# Patient Record
Sex: MALE | Race: WHITE | NOT HISPANIC OR LATINO | URBAN - METROPOLITAN AREA
[De-identification: names, ages, dates, MRNs, and addresses within clinical notes are randomized per-mention and may not be internally consistent; named-entity substitution may affect disease eponyms.]

---

## 2021-08-02 ENCOUNTER — OUTPATIENT (OUTPATIENT)
Dept: OUTPATIENT SERVICES | Facility: HOSPITAL | Age: 26
LOS: 1 days | End: 2021-08-02
Payer: MEDICAID

## 2021-08-02 ENCOUNTER — APPOINTMENT (OUTPATIENT)
Dept: ULTRASOUND IMAGING | Facility: CLINIC | Age: 26
End: 2021-08-02
Payer: MEDICAID

## 2021-08-02 DIAGNOSIS — R59.0 LOCALIZED ENLARGED LYMPH NODES: ICD-10-CM

## 2021-08-02 PROCEDURE — 76536 US EXAM OF HEAD AND NECK: CPT | Mod: 26

## 2021-08-02 PROCEDURE — 76536 US EXAM OF HEAD AND NECK: CPT

## 2021-08-04 ENCOUNTER — TRANSCRIPTION ENCOUNTER (OUTPATIENT)
Age: 26
End: 2021-08-04

## 2024-08-29 ENCOUNTER — APPOINTMENT (OUTPATIENT)
Dept: PULMONOLOGY | Facility: CLINIC | Age: 29
End: 2024-08-29

## 2024-08-29 ENCOUNTER — EMERGENCY (EMERGENCY)
Facility: HOSPITAL | Age: 29
LOS: 1 days | Discharge: HOME | End: 2024-08-29
Attending: EMERGENCY MEDICINE
Payer: MEDICAID

## 2024-08-29 VITALS
OXYGEN SATURATION: 100 % | WEIGHT: 158.51 LBS | HEART RATE: 97 BPM | DIASTOLIC BLOOD PRESSURE: 78 MMHG | HEIGHT: 70 IN | RESPIRATION RATE: 20 BRPM | SYSTOLIC BLOOD PRESSURE: 145 MMHG

## 2024-08-29 LAB
ALBUMIN SERPL ELPH-MCNC: 4.5 G/DL — SIGNIFICANT CHANGE UP (ref 3.3–5)
ALP SERPL-CCNC: 72 U/L — SIGNIFICANT CHANGE UP (ref 40–120)
ALT FLD-CCNC: 26 U/L — SIGNIFICANT CHANGE UP (ref 10–45)
ANION GAP SERPL CALC-SCNC: 12 MMOL/L — SIGNIFICANT CHANGE UP (ref 5–17)
AST SERPL-CCNC: 22 U/L — SIGNIFICANT CHANGE UP (ref 10–40)
BASOPHILS # BLD AUTO: 0.02 K/UL — SIGNIFICANT CHANGE UP (ref 0–0.2)
BASOPHILS NFR BLD AUTO: 0.3 % — SIGNIFICANT CHANGE UP (ref 0–2)
BILIRUB SERPL-MCNC: 0.6 MG/DL — SIGNIFICANT CHANGE UP (ref 0.2–1.2)
BUN SERPL-MCNC: 21 MG/DL — SIGNIFICANT CHANGE UP (ref 7–23)
CALCIUM SERPL-MCNC: 9.6 MG/DL — SIGNIFICANT CHANGE UP (ref 8.4–10.5)
CHLORIDE SERPL-SCNC: 101 MMOL/L — SIGNIFICANT CHANGE UP (ref 96–108)
CO2 SERPL-SCNC: 25 MMOL/L — SIGNIFICANT CHANGE UP (ref 22–31)
CREAT SERPL-MCNC: 1.13 MG/DL — SIGNIFICANT CHANGE UP (ref 0.5–1.3)
EGFR: 91 ML/MIN/1.73M2 — SIGNIFICANT CHANGE UP
EOSINOPHIL # BLD AUTO: 0.04 K/UL — SIGNIFICANT CHANGE UP (ref 0–0.5)
EOSINOPHIL NFR BLD AUTO: 0.7 % — SIGNIFICANT CHANGE UP (ref 0–6)
FLUAV AG NPH QL: SIGNIFICANT CHANGE UP
FLUBV AG NPH QL: SIGNIFICANT CHANGE UP
GLUCOSE SERPL-MCNC: 109 MG/DL — HIGH (ref 70–99)
HCT VFR BLD CALC: 44.3 % — SIGNIFICANT CHANGE UP (ref 39–50)
HGB BLD-MCNC: 15 G/DL — SIGNIFICANT CHANGE UP (ref 13–17)
IMM GRANULOCYTES NFR BLD AUTO: 0.3 % — SIGNIFICANT CHANGE UP (ref 0–0.9)
LYMPHOCYTES # BLD AUTO: 0.99 K/UL — LOW (ref 1–3.3)
LYMPHOCYTES # BLD AUTO: 17.2 % — SIGNIFICANT CHANGE UP (ref 13–44)
MCHC RBC-ENTMCNC: 27.7 PG — SIGNIFICANT CHANGE UP (ref 27–34)
MCHC RBC-ENTMCNC: 33.9 GM/DL — SIGNIFICANT CHANGE UP (ref 32–36)
MCV RBC AUTO: 81.9 FL — SIGNIFICANT CHANGE UP (ref 80–100)
MONOCYTES # BLD AUTO: 0.45 K/UL — SIGNIFICANT CHANGE UP (ref 0–0.9)
MONOCYTES NFR BLD AUTO: 7.8 % — SIGNIFICANT CHANGE UP (ref 2–14)
NEUTROPHILS # BLD AUTO: 4.23 K/UL — SIGNIFICANT CHANGE UP (ref 1.8–7.4)
NEUTROPHILS NFR BLD AUTO: 73.7 % — SIGNIFICANT CHANGE UP (ref 43–77)
NRBC # BLD: 0 /100 WBCS — SIGNIFICANT CHANGE UP (ref 0–0)
PLATELET # BLD AUTO: 221 K/UL — SIGNIFICANT CHANGE UP (ref 150–400)
POTASSIUM SERPL-MCNC: 4 MMOL/L — SIGNIFICANT CHANGE UP (ref 3.5–5.3)
POTASSIUM SERPL-SCNC: 4 MMOL/L — SIGNIFICANT CHANGE UP (ref 3.5–5.3)
PROT SERPL-MCNC: 7.5 G/DL — SIGNIFICANT CHANGE UP (ref 6–8.3)
RBC # BLD: 5.41 M/UL — SIGNIFICANT CHANGE UP (ref 4.2–5.8)
RBC # FLD: 12.4 % — SIGNIFICANT CHANGE UP (ref 10.3–14.5)
RSV RNA NPH QL NAA+NON-PROBE: SIGNIFICANT CHANGE UP
SARS-COV-2 RNA SPEC QL NAA+PROBE: SIGNIFICANT CHANGE UP
SODIUM SERPL-SCNC: 138 MMOL/L — SIGNIFICANT CHANGE UP (ref 135–145)
WBC # BLD: 5.75 K/UL — SIGNIFICANT CHANGE UP (ref 3.8–10.5)
WBC # FLD AUTO: 5.75 K/UL — SIGNIFICANT CHANGE UP (ref 3.8–10.5)

## 2024-08-29 PROCEDURE — 85025 COMPLETE CBC W/AUTO DIFF WBC: CPT

## 2024-08-29 PROCEDURE — 87637 SARSCOV2&INF A&B&RSV AMP PRB: CPT

## 2024-08-29 PROCEDURE — 71046 X-RAY EXAM CHEST 2 VIEWS: CPT

## 2024-08-29 PROCEDURE — 93005 ELECTROCARDIOGRAM TRACING: CPT

## 2024-08-29 PROCEDURE — 94640 AIRWAY INHALATION TREATMENT: CPT

## 2024-08-29 PROCEDURE — 99285 EMERGENCY DEPT VISIT HI MDM: CPT | Mod: 25

## 2024-08-29 PROCEDURE — 36000 PLACE NEEDLE IN VEIN: CPT

## 2024-08-29 PROCEDURE — 99284 EMERGENCY DEPT VISIT MOD MDM: CPT

## 2024-08-29 PROCEDURE — 84436 ASSAY OF TOTAL THYROXINE: CPT

## 2024-08-29 PROCEDURE — 80053 COMPREHEN METABOLIC PANEL: CPT

## 2024-08-29 PROCEDURE — 84443 ASSAY THYROID STIM HORMONE: CPT

## 2024-08-29 PROCEDURE — 84480 ASSAY TRIIODOTHYRONINE (T3): CPT

## 2024-08-29 PROCEDURE — 71046 X-RAY EXAM CHEST 2 VIEWS: CPT | Mod: 26

## 2024-08-29 RX ADMIN — Medication 2.5 MILLIGRAM(S): at 16:33

## 2024-08-29 NOTE — ED ADULT NURSE NOTE - OBJECTIVE STATEMENT
28 year old male presented to ED with c/o of shortness of breath and anxiety due to 'feeling of object in throat'. pt went to ENT and was diagnosed with a 'globus sensation' caused by anxiety or possible mood disorder. pt prescribed Xanax, pt reports Xanax helping with symptoms but still feels the sensation in throat. pt denies medical history. pt calm and cooperative in ED. pt denies CP, SOB, nausea/vomiting, numbness/tingling, fever, cough, chills, dizziness, headache, blurred vision, neuro intact. pt a&ox3, lung sounds clear, heart rate regular, abdomen soft nontender nondistended to palp. skin intact. IV in LAC 20G and patent. Will continue to monitor and assess while offering support and reassurance.

## 2024-08-29 NOTE — ED PROVIDER NOTE - PHYSICAL EXAMINATION
GEN: Patient awake and alert. No acute distress, non-toxic. Anxious appearing.   Head: Normocephalic, atraumatic.  Neck: Nontender, full ROM.   Eyes: PERRLA b/l. EOMI, no scleral icterus, no conjunctival injection. Moist mucous membranes.  CARDIAC: RRR. Normal S1, S2. No murmur, rubs, or gallops. No peripheral edema noted.  PULM: Speaking in full sentences. CTA B/L no wheeze, rales or rhonchi. No signs of respiratory distress, no accessory muscle usage or nasal flaring.  ABD: Soft, nontender, nondistended. No rebound, no involuntary guarding.   MSK: Moving all extremities spontaneously. Full ROM in extremities. No obvious deformity.  NEURO: A&Ox3, no focal neurological deficits  SKIN: Warm, dry, no rash, no lesions, no open wounds. No urticaria. No jaundice.

## 2024-08-29 NOTE — ED PROVIDER NOTE - NSFOLLOWUPINSTRUCTIONS_ED_ALL_ED_FT
See your Primary Doctor this week for follow up -- call to discuss.    Rest, stay well hydrated, minimize stress.    Use Albuterol with spacer as directed for bronchitis -- see medication warnings.    See BRONCHITIS and ANXIETY information and return instructions given to you.    Seek immediate medical care for new/worsening symptoms/concerns. See your Primary Doctor this week for follow up -- call to discuss.    Rest, stay well hydrated, minimize stress.    Use Albuterol with spacer as directed for bronchitis -- see medication warnings.    See BRONCHITIS and ANXIETY information and return instructions given to you.    Seek immediate medical care for new/worsening symptoms/concerns.    ----------------------------------------------------------------------------------------------------------------     Please follow up at the [facility] which is located at:     [phone] [place] [address] [id number] [phone]    please call to schedule an appointment but they also have walk in hours during the weekday between the hours of 9am to 5pm so you can walk in to see if you can see a psychiatrist then.   ----------------------------------------------------------------------------------------------------------------   Acute Bronchitis, Adult  A comparison between normal and swollen airways, or bronchi, in the lungs.  Acute bronchitis is sudden inflammation of the main airways (bronchi) that come off the windpipe (trachea) in the lungs. The swelling causes the airways to get smaller and make more mucus than normal. This can make it hard to breathe and can cause coughing or noisy breathing (wheezing).    Acute bronchitis may last several weeks. The cough may last longer. Allergies, asthma, and exposure to smoke may make the condition worse.    What are the causes?  This condition can be caused by germs and by substances that irritate the lungs, including:  Cold and flu viruses. The most common cause of this condition is the virus that causes the common cold.  Bacteria. This is less common.  Breathing in substances that irritate the lungs, including:  Smoke from cigarettes and other forms of tobacco.  Dust and pollen.  Fumes from household cleaning products, gases, or burned fuel.  Indoor or outdoor air pollution.  What increases the risk?  The following factors may make you more likely to develop this condition:  A weak body's defense system, also called the immune system.  A condition that affects your lungs and breathing, such as asthma.  What are the signs or symptoms?  Common symptoms of this condition include:  Coughing. This may bring up clear, yellow, or green mucus from your lungs (sputum).  Wheezing.  Runny or stuffy nose.  Having too much mucus in your lungs (chest congestion).  Shortness of breath.  Aches and pains, including sore throat or chest.  How is this diagnosed?  This condition is usually diagnosed based on:  Your symptoms and medical history.  A physical exam.  You may also have other tests, including tests to rule out other conditions, such as pneumonia. These tests include:  A test of lung function.  Test of a mucus sample to look for the presence of bacteria.  Tests to check the oxygen level in your blood.  Blood tests.  Chest X-ray.  How is this treated?  Most cases of acute bronchitis clear up over time without treatment. Your health care provider may recommend:  Drinking more fluids to help thin your mucus so it is easier to cough up.  Taking inhaled medicine (inhaler) to improve air flow in and out of your lungs.  Using a vaporizer or a humidifier. These are machines that add water to the air to help you breathe better.  Taking a medicine that thins mucus and clears congestion (expectorant).  Taking a medicine that prevents or stops coughing (cough suppressant).  It is not common to take an antibiotic medicine for this condition.    Follow these instructions at home:  A do not smoke cigarettes sign.  Take over-the-counter and prescription medicines only as told by your health care provider.  Use an inhaler, vaporizer, or humidifier as told by your health care provider.  Take two teaspoons (10 mL) of honey at bedtime to lessen coughing at night.  Drink enough fluid to keep your urine pale yellow.  Do not use any products that contain nicotine or tobacco. These products include cigarettes, chewing tobacco, and vaping devices, such as e-cigarettes. If you need help quitting, ask your health care provider.  Get plenty of rest.  Return to your normal activities as told by your health care provider. Ask your health care provider what activities are safe for you.  Keep all follow-up visits. This is important.  How is this prevented?  Washing hands with soap and water.  To lower your risk of getting this condition again:  Wash your hands often with soap and water for at least 20 seconds. If soap and water are not available, use hand .  Avoid contact with people who have cold symptoms.  Try not to touch your mouth, nose, or eyes with your hands.  Avoid breathing in smoke or chemical fumes. Breathing smoke or chemical fumes will make your condition worse.  Get the flu shot every year.  Contact a health care provider if:  Your symptoms do not improve after 2 weeks.  You have trouble coughing up the mucus.  Your cough keeps you awake at night.  You have a fever.  Get help right away if you:  Cough up blood.  Feel pain in your chest.  Have severe shortness of breath.  Faint or keep feeling like you are going to faint.  Have a severe headache.  Have a fever or chills that get worse.  These symptoms may represent a serious problem that is an emergency. Do not wait to see if the symptoms will go away. Get medical help right away. Call your local emergency services (911 in the [place] Do not drive yourself to the hospital.    Summary  Acute bronchitis is inflammation of the main airways (bronchi) that come off the windpipe (trachea) in the lungs. The swelling causes the airways to get smaller and make more mucus than normal.  Drinking more fluids can help thin your mucus so it is easier to cough up.  Take over-the-counter and prescription medicines only as told by your health care provider.  Do not use any products that contain nicotine or tobacco. These products include cigarettes, chewing tobacco, and vaping devices, such as e-cigarettes. If you need help quitting, ask your health care provider.  Contact a health care provider if your symptoms do not improve after 2 weeks.  ----------------------------------------------------------------------------------------------------------------   Managing Anxiety, Adult    After being diagnosed with anxiety, you may be relieved to know why you have felt or behaved a certain way. You may also feel overwhelmed about the treatment ahead and what it will mean for your life. With care and support, you can manage your anxiety.    How to manage lifestyle changes  Understanding the difference between stress and anxiety    Although stress can play a role in anxiety, it is not the same as anxiety. Stress is your body's reaction to life changes and events, both good and bad. Stress is often caused by something external, such as a deadline, test, or competition. It normally goes away after the event has ended and will last just a few hours. But, stress can be ongoing and can lead to more than just stress.    Anxiety is caused by something internal, such as imagining a terrible outcome or worrying that something will go wrong that will greatly upset you. Anxiety often does not go away even after the event is over, and it can become a long-term (chronic) worry.    Lowering stress and anxiety    An adult doing mindful breathing while practicing yoga.  Talk with your health care provider or a counselor to learn more about lowering anxiety and stress. They may suggest tension-reduction techniques, such as:  Music. Spend time creating or listening to music that you enjoy and that inspires you.  Mindfulness-based meditation. Practice being aware of your normal breaths while not trying to control your breathing. It can be done while sitting or walking.  Centering prayer. Focus on a word, phrase, or sacred image that means something to you and brings you peace.  Deep breathing. Expand your stomach and inhale slowly through your nose. Hold your breath for 3–5 seconds. Then breathe out slowly, letting your stomach muscles relax.  Self-talk. Learn to notice and spot thought patterns that lead to anxiety reactions. Change those patterns to thoughts that feel peaceful.  Muscle relaxation. Take time to tense muscles and then relax them.  Choose a tension-reduction technique that fits your lifestyle and personality. These techniques take time and practice. Set aside 5–15 minutes a day to do them. Specialized therapists can offer counseling and training in these techniques. The training to help with anxiety may be covered by some insurance plans.    Other things you can do to manage stress and anxiety include:  Keeping a stress diary. This can help you learn what triggers your reaction and then learn ways to manage your response.  Thinking about how you react to certain situations. You may not be able to control everything, but you can control your response.  Making time for activities that help you relax and not feeling guilty about spending your time in this way.  Doing visual imagery. This involves imagining or creating mental pictures to help you relax.  Practicing yoga. Through yoga poses, you can lower tension and relax.    Medicines    Medicines for anxiety include:  Antidepressant medicines. These are usually prescribed for long-term daily control.  Anti-anxiety medicines. These may be added in severe cases, especially when panic attacks occur.  When used together, medicines, psychotherapy, and tension-reduction techniques may be the most effective treatment.    Relationships    Relationships can play a big part in helping you recover. Spend more time connecting with trusted friends and family members. Think about going to couples counseling if you have a partner, taking family education classes, or going to family therapy. Therapy can help you and others better understand your anxiety.    How to recognize changes in your anxiety  Everyone responds differently to treatment for anxiety. Recovery from anxiety happens when symptoms lessen and stop interfering with your daily life at home or work. This may mean that you will start to:  Have better concentration and focus. Worry will interfere less in your daily thinking.  Sleep better.  Be less irritable.  Have more energy.  Have improved memory.  Try to recognize when your condition is getting worse. Contact your provider if your symptoms interfere with home or work and you feel like your condition is not improving.    Follow these instructions at home:    Activity    Exercise. Adults should:  Exercise for at least 150 minutes each week. The exercise should increase your heart rate and make you sweat (moderate-intensity exercise).  Do strengthening exercises at least twice a week.  Get the right amount and quality of sleep. Most adults need 7–9 hours of sleep each night.    Lifestyle    Two adults cooking together in a kitchen. Fruit and vegetables are on the counter in front of them.  Eat a healthy diet that includes plenty of vegetables, fruits, whole grains, low-fat dairy products, and lean protein.  Do not eat a lot of foods that are high in fats, added sugars, or salt (sodium).  Make choices that simplify your life.  Do not use any products that contain nicotine or tobacco. These products include cigarettes, chewing tobacco, and vaping devices, such as e-cigarettes. If you need help quitting, ask your provider.  Avoid caffeine, alcohol, and certain over-the-counter cold medicines. These may make you feel worse. Ask your pharmacist which medicines to avoid.    General instructions    Take over-the-counter and prescription medicines only as told by your provider.  Keep all follow-up visits. This is to make sure you are managing your anxiety well or if you need more support.  Where to find support  You can get help and support from:  Self-help groups.  Online and community organizations.  A trusted spiritual leader.  Couples counseling.  Family education classes.  Family therapy.  Where to find more information  You may find that joining a support group helps you deal with your anxiety. The following sources can help you find counselors or support groups near you:  Mental Health [place]: mentalhealth[place].net  Anxiety and Depression Association of [place] (ADAA): adaa.org  National [place] on Mental Illness (KIRSTIE): kirstie.org    Contact a health care provider if:  You have a hard time staying focused or finishing tasks.  You spend many hours a day feeling worried about everyday life.  You are very tired because you cannot stop worrying.  You start to have headaches or often feel tense.  You have chronic nausea or diarrhea.    Get help right away if:  Your heart feels like it is racing.  You have shortness of breath.  You have thoughts of hurting yourself or others.    Get help right away if you feel like you may hurt yourself or others, or have thoughts about taking your own life. Go to your nearest emergency room or:  Call 911.  Call the National Suicide Prevention Lifeline at 1-714.338.9982 or 227. This is open 24 hours a day.  Text the Crisis Text Line at 500806.

## 2024-08-29 NOTE — ED ADULT TRIAGE NOTE - SPO2 (%)
Pt is requesting a covid test and does not have a [facility]'s PCP  Reports having an out of network PCP  Order placed  Pt informed of closest testing site and was advised of hours of operation, address, to wear a mask, and to stay in the car  Pt verbalized understanding  Link sent to pts email address to create a Center[facility] account to check for results  Reason for Disposition   [1] COVID-19 infection suspected by caller or triager AND [2] mild symptoms (cough, fever, or others) AND [0] no complications or SOB    Answer Assessment - Initial Assessment Questions  Were you within 6 feet or less, for up to 15 minutes or more with a person that has a confirmed COVID-19 test? Yes     What was the date of your exposure? Living with daughter who tested positive     Are you experiencing any symptoms attributed to the virus?  (Assess for SOB, cough, fever, difficulty breathing) mild cough     HIGH RISK: Do you have any history heart or lung conditions, weakened immune system, diabetes, Asthma, CHF, HIV, COPD, Chemo, renal failure, sickle cell, etc? 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  N/A    Protocols used: CORONAVIRUS (COVID-19) DIAGNOSED OR SUSPECTED-ADULT- 100

## 2024-08-29 NOTE — ED ADULT NURSE NOTE - CHIEF COMPLAINT QUOTE
Pt reports SOB, throat/chest tightness and feeling "a blockage." Pt speaking in clear coherent sentences with no obvious signs of respiratory distress   Pt reports sore throat last week, now resolved   Was seen by ENT yesterday and neg for scope. Pt was prescribed xanax yesterday due to suspected anxiety. Pt took xanax prior to coming into the ED

## 2024-08-29 NOTE — ED PROVIDER NOTE - PROGRESS NOTE DETAILS
Cachorro Morgan MD PGY2: Patient reassessed, stable. Peak flow prior to beta agonist is average ~300 after 3 trials with good effort. Will give beta agonist and assess response. Cachorro Morgan MD PGY2: Patient reassessed, stable. Post bronchodilator with peak flow 700-800. Marked positive response to bronchodilator suggesting likely obstructing lung disease / asthma that was flared or presented itself in setting of recent URI. Tolerated PO. Patient has albuterol MDI. Given spacer. Will DC with [facility] resources and pulmonology follow up. Patient given strict return precautions.

## 2024-08-29 NOTE — ED PROVIDER NOTE - RAPID ASSESSMENT
29 y/o M sore throat and HA last week with other URI sxs reports a variety of different colors of sputum, now with persistent "thick white mucous" and feeling chest congestion and difficulty breathing. Pt reports he has followed with ENT and had laryngoscopy reportedly non-actionable with no reportedly abnormal findings. Patient reports having significant psychologic distress over symptoms as he feels that he could "stop breathing and a second".  He was prescribed Xanax by ENT for possible anxiety component, but patient believes there is still organic pathology, tried to make an appointment with pulmonologist today but required referral from his PCP, was unable to get in touch with his PCP so came to the ED for further evaluation.  Patient denies history of mood disorder/anxiety.  [phone]  Patient was seen as a rapid assessment (QPA) patient. This is an incomplete workup and it is intended that the patient will be seen in the main emergency department. The patient will be seen and further worked up in the main emergency department and their care will be completed by the main emergency department team along with a thorough physical exam. Receiving team will follow up on labs, analgesia, any clinical imaging, reassess and disposition as clinically indicated, all decisions regarding the progression of care will be made at their discretion. - Cristo Mitchell PA-C

## 2024-08-29 NOTE — ED PROVIDER NOTE - ATTENDING CONTRIBUTION TO CARE
------------ATTENDING NOTE------------  pt c/o sob / vague chest discomfort, has been coughing, no fevers, no known asthma / airway disease, trial peak flow w/ improved air movement, CXR w/ signs hyperinflation, ? reactive component to bronchitis, labs overall wnl, complicated as pt has baseline anxiety and very anxious over this -- clear chest /wo distress, nml cardiac exam, equal distal pulses, soft benign abd, no edema/calf tenderness, no drugs/intoxicants, no SI/HI and feels safe at home and not interested in c/w Psych today -- nml VS when calm (HR 70s, RR 12), toleraitng PO, in depth dw pt about ddx, tx, masters, continued close outpt fu.  - Mark Anthony Nation MD   ------------------------------------------------------

## 2024-08-29 NOTE — ED PROVIDER NOTE - NSFOLLOWUPCLINICS_GEN_ALL_ED_FT
[facility]  Pulmonology  [address] [place] [address], [facility] [address]  [place] [address]  Phone: [phone]  Fax:   Follow Up Time: 1-3 Days

## 2024-08-29 NOTE — ED PROVIDER NOTE - OBJECTIVE STATEMENT
28-year-old male with no segment past medical history presenting to the emergency department for several days of persistent congestion/chest discomfort with difficulty breathing.  Patient reports symptoms started after URI last week.  Has had significant persistent thick sputum.  Went to an ENT who did a laryngoscopy that saw no actionable findings, congestion, trauma, abnormalities in the airway.  Patient reports he is having significant stress over the symptoms and feels like he could "stop breathing any second".  He was prescribed Xanax by ENT for possible anxiety component and to treat globus sensation which he feels like sometimes works temporarily.  Was recommended to see a pulmonologist but has not been on that able due to need for referral from his PCP.  Was also prescribed albuterol inhaler which he only tried 1 time and Augmentin.  Patient reports a retained ability to tolerate oral intake.  Denies fever/chills.  No syncope or near syncope.  Denies SI, HI, AVH.

## 2024-08-29 NOTE — ED ADULT NURSE NOTE - SUICIDE SCREENING QUESTION 1
Patient's colonoscopy has been changed to 1/23/2023 with Dr Tabares. Patient states that he has his prep and the instructions.       Yes

## 2024-08-29 NOTE — ED PROVIDER NOTE - PATIENT PORTAL LINK FT
You can access the FollowMyHealth Patient Portal offered by [facility] by registering at the following website: [email or web address]. By joining [facility]’s FollowMyHealth portal, you will also be able to view your health information using other applications (apps) compatible with our system.

## 2024-08-29 NOTE — ED ADULT NURSE NOTE - NSFALLUNIVINTERV_ED_ALL_ED
Bed/Stretcher in lowest position, wheels locked, appropriate side rails in place/Call bell, personal items and telephone in reach/Instruct patient to call for assistance before getting out of bed/chair/stretcher/Non-slip footwear applied when patient is off stretcher/[place] to call system/Physically safe environment - no spills, clutter or unnecessary equipment/Purposeful proactive rounding/Room/bathroom lighting operational, light cord in reach

## 2024-08-30 ENCOUNTER — EMERGENCY (EMERGENCY)
Facility: HOSPITAL | Age: 29
LOS: 1 days | Discharge: HOME | End: 2024-08-30
Payer: MEDICAID

## 2024-08-30 VITALS
HEART RATE: 82 BPM | TEMPERATURE: 98 F | RESPIRATION RATE: 18 BRPM | DIASTOLIC BLOOD PRESSURE: 73 MMHG | SYSTOLIC BLOOD PRESSURE: 119 MMHG | OXYGEN SATURATION: 98 %

## 2024-08-30 VITALS
DIASTOLIC BLOOD PRESSURE: 75 MMHG | WEIGHT: 158.73 LBS | HEART RATE: 98 BPM | OXYGEN SATURATION: 98 % | RESPIRATION RATE: 18 BRPM | TEMPERATURE: 99 F | SYSTOLIC BLOOD PRESSURE: 134 MMHG | HEIGHT: 70 IN

## 2024-08-30 DIAGNOSIS — F43.22 ADJUSTMENT DISORDER WITH ANXIETY: ICD-10-CM

## 2024-08-30 LAB
ALBUMIN SERPL ELPH-MCNC: 4.4 G/DL — SIGNIFICANT CHANGE UP (ref 3.3–5)
ALP SERPL-CCNC: 69 U/L — SIGNIFICANT CHANGE UP (ref 40–120)
ALT FLD-CCNC: 24 U/L — SIGNIFICANT CHANGE UP (ref 10–45)
ANION GAP SERPL CALC-SCNC: 11 MMOL/L — SIGNIFICANT CHANGE UP (ref 5–17)
APAP SERPL-MCNC: <15 UG/ML — SIGNIFICANT CHANGE UP (ref 10–30)
AST SERPL-CCNC: 18 U/L — SIGNIFICANT CHANGE UP (ref 10–40)
BASOPHILS # BLD AUTO: 0.02 K/UL — SIGNIFICANT CHANGE UP (ref 0–0.2)
BASOPHILS NFR BLD AUTO: 0.4 % — SIGNIFICANT CHANGE UP (ref 0–2)
BILIRUB SERPL-MCNC: 0.8 MG/DL — SIGNIFICANT CHANGE UP (ref 0.2–1.2)
BUN SERPL-MCNC: 18 MG/DL — SIGNIFICANT CHANGE UP (ref 7–23)
CALCIUM SERPL-MCNC: 9.6 MG/DL — SIGNIFICANT CHANGE UP (ref 8.4–10.5)
CHLORIDE SERPL-SCNC: 103 MMOL/L — SIGNIFICANT CHANGE UP (ref 96–108)
CO2 SERPL-SCNC: 24 MMOL/L — SIGNIFICANT CHANGE UP (ref 22–31)
CREAT SERPL-MCNC: 1.18 MG/DL — SIGNIFICANT CHANGE UP (ref 0.5–1.3)
EGFR: 87 ML/MIN/1.73M2 — SIGNIFICANT CHANGE UP
EOSINOPHIL # BLD AUTO: 0.05 K/UL — SIGNIFICANT CHANGE UP (ref 0–0.5)
EOSINOPHIL NFR BLD AUTO: 1 % — SIGNIFICANT CHANGE UP (ref 0–6)
ETHANOL SERPL-MCNC: <10 MG/DL — SIGNIFICANT CHANGE UP (ref 0–10)
GLUCOSE SERPL-MCNC: 105 MG/DL — HIGH (ref 70–99)
HCT VFR BLD CALC: 43.2 % — SIGNIFICANT CHANGE UP (ref 39–50)
HGB BLD-MCNC: 14.9 G/DL — SIGNIFICANT CHANGE UP (ref 13–17)
IMM GRANULOCYTES NFR BLD AUTO: 0.4 % — SIGNIFICANT CHANGE UP (ref 0–0.9)
LYMPHOCYTES # BLD AUTO: 0.91 K/UL — LOW (ref 1–3.3)
LYMPHOCYTES # BLD AUTO: 17.6 % — SIGNIFICANT CHANGE UP (ref 13–44)
MCHC RBC-ENTMCNC: 28.4 PG — SIGNIFICANT CHANGE UP (ref 27–34)
MCHC RBC-ENTMCNC: 34.5 GM/DL — SIGNIFICANT CHANGE UP (ref 32–36)
MCV RBC AUTO: 82.4 FL — SIGNIFICANT CHANGE UP (ref 80–100)
MONOCYTES # BLD AUTO: 0.51 K/UL — SIGNIFICANT CHANGE UP (ref 0–0.9)
MONOCYTES NFR BLD AUTO: 9.9 % — SIGNIFICANT CHANGE UP (ref 2–14)
NEUTROPHILS # BLD AUTO: 3.66 K/UL — SIGNIFICANT CHANGE UP (ref 1.8–7.4)
NEUTROPHILS NFR BLD AUTO: 70.7 % — SIGNIFICANT CHANGE UP (ref 43–77)
NRBC # BLD: 0 /100 WBCS — SIGNIFICANT CHANGE UP (ref 0–0)
PLATELET # BLD AUTO: 210 K/UL — SIGNIFICANT CHANGE UP (ref 150–400)
POTASSIUM SERPL-MCNC: 4 MMOL/L — SIGNIFICANT CHANGE UP (ref 3.5–5.3)
POTASSIUM SERPL-SCNC: 4 MMOL/L — SIGNIFICANT CHANGE UP (ref 3.5–5.3)
PROT SERPL-MCNC: 7.3 G/DL — SIGNIFICANT CHANGE UP (ref 6–8.3)
RBC # BLD: 5.24 M/UL — SIGNIFICANT CHANGE UP (ref 4.2–5.8)
RBC # FLD: 12.3 % — SIGNIFICANT CHANGE UP (ref 10.3–14.5)
SALICYLATES SERPL-MCNC: <2 MG/DL — LOW (ref 15–30)
SARS-COV-2 RNA SPEC QL NAA+PROBE: SIGNIFICANT CHANGE UP
SODIUM SERPL-SCNC: 138 MMOL/L — SIGNIFICANT CHANGE UP (ref 135–145)
T3 SERPL-MCNC: 70 NG/DL — LOW (ref 80–200)
T4 AB SER-ACNC: 7.3 UG/DL — SIGNIFICANT CHANGE UP (ref 4.6–12)
TROPONIN T, HIGH SENSITIVITY RESULT: 7 NG/L — SIGNIFICANT CHANGE UP (ref 0–51)
TSH SERPL-MCNC: 1.18 UIU/ML — SIGNIFICANT CHANGE UP (ref 0.27–4.2)
TSH SERPL-MCNC: 1.35 UIU/ML — SIGNIFICANT CHANGE UP (ref 0.27–4.2)
WBC # BLD: 5.17 K/UL — SIGNIFICANT CHANGE UP (ref 3.8–10.5)
WBC # FLD AUTO: 5.17 K/UL — SIGNIFICANT CHANGE UP (ref 3.8–10.5)

## 2024-08-30 PROCEDURE — 85379 FIBRIN DEGRADATION QUANT: CPT

## 2024-08-30 PROCEDURE — 84443 ASSAY THYROID STIM HORMONE: CPT

## 2024-08-30 PROCEDURE — 99285 EMERGENCY DEPT VISIT HI MDM: CPT

## 2024-08-30 PROCEDURE — 87635 SARS-COV-2 COVID-19 AMP PRB: CPT

## 2024-08-30 PROCEDURE — 84484 ASSAY OF TROPONIN QUANT: CPT

## 2024-08-30 PROCEDURE — 90792 PSYCH DIAG EVAL W/MED SRVCS: CPT

## 2024-08-30 PROCEDURE — 83880 ASSAY OF NATRIURETIC PEPTIDE: CPT

## 2024-08-30 PROCEDURE — 85025 COMPLETE CBC W/AUTO DIFF WBC: CPT

## 2024-08-30 PROCEDURE — 94640 AIRWAY INHALATION TREATMENT: CPT

## 2024-08-30 PROCEDURE — 80307 DRUG TEST PRSMV CHEM ANLYZR: CPT

## 2024-08-30 PROCEDURE — 80053 COMPREHEN METABOLIC PANEL: CPT

## 2024-08-30 PROCEDURE — 99285 EMERGENCY DEPT VISIT HI MDM: CPT | Mod: 25

## 2024-08-30 RX ORDER — MAGNESIUM, ALUMINUM HYDROXIDE 200-225/5
30 SUSPENSION, ORAL (FINAL DOSE FORM) ORAL EVERY 4 HOURS
Refills: 0 | Status: DISCONTINUED | OUTPATIENT
Start: 2024-08-30 | End: 2024-09-02

## 2024-08-30 RX ORDER — LORAZEPAM 4 MG/ML
1 INJECTION INTRAMUSCULAR; INTRAVENOUS ONCE
Refills: 0 | Status: DISCONTINUED | OUTPATIENT
Start: 2024-08-30 | End: 2024-08-30

## 2024-08-30 RX ORDER — FAMOTIDINE 10 MG/ML
20 INJECTION INTRAVENOUS ONCE
Refills: 0 | Status: COMPLETED | OUTPATIENT
Start: 2024-08-30 | End: 2024-08-30

## 2024-08-30 RX ORDER — IPRATROPIUM BROMIDE AND ALBUTEROL SULFATE .5; 3 MG/3ML; MG/3ML
3 SOLUTION RESPIRATORY (INHALATION) ONCE
Refills: 0 | Status: COMPLETED | OUTPATIENT
Start: 2024-08-30 | End: 2024-08-30

## 2024-08-30 RX ORDER — LORAZEPAM 4 MG/ML
1 INJECTION INTRAMUSCULAR; INTRAVENOUS
Qty: 7 | Refills: 0
Start: 2024-08-30 | End: 2024-09-05

## 2024-08-30 RX ORDER — KETOROLAC TROMETHAMINE 30 MG/ML
15 INJECTION, SOLUTION INTRAMUSCULAR ONCE
Refills: 0 | Status: DISCONTINUED | OUTPATIENT
Start: 2024-08-30 | End: 2024-08-30

## 2024-08-30 RX ORDER — FAMOTIDINE 10 MG/ML
20 INJECTION INTRAVENOUS ONCE
Refills: 0 | Status: DISCONTINUED | OUTPATIENT
Start: 2024-08-30 | End: 2024-08-30

## 2024-08-30 RX ADMIN — IPRATROPIUM BROMIDE AND ALBUTEROL SULFATE 3 MILLILITER(S): .5; 3 SOLUTION RESPIRATORY (INHALATION) at 15:51

## 2024-08-30 RX ADMIN — Medication 30 MILLILITER(S): at 16:43

## 2024-08-30 RX ADMIN — LORAZEPAM 1 MILLIGRAM(S): 4 INJECTION INTRAMUSCULAR; INTRAVENOUS at 18:45

## 2024-08-30 RX ADMIN — LORAZEPAM 1 MILLIGRAM(S): 4 INJECTION INTRAMUSCULAR; INTRAVENOUS at 11:20

## 2024-08-30 RX ADMIN — FAMOTIDINE 20 MILLIGRAM(S): 10 INJECTION INTRAVENOUS at 16:07

## 2024-08-30 NOTE — ED BEHAVIORAL HEALTH ASSESSMENT NOTE - SUMMARY
Pt is a 27yo male w/o PMH or past psych hx presents with SOB and associated anxiety. His ENT workup and X-ray were negative for pathology and he received albuterol and xanax to help his symptoms. He states that "I can't live like this, I have trouble breathing, I don't want to live like this". Denies an intent to hurt himself, or wanting to be dead. A&Ox4. Denies HIIP/AVH.

## 2024-08-30 NOTE — ED BEHAVIORAL HEALTH ASSESSMENT NOTE - DESCRIPTION
Received one dose of Ativan 1mg at 11 am for anxiety which helped him calming down. nk Lives with parents, self employed and works part-time in [employer], have a list of hobbies and generally healthy and functional

## 2024-08-30 NOTE — ED ADULT NURSE NOTE - OBJECTIVE STATEMENT
28 year old male BIB sister with anxiety; A&O; passive SI, no plan or intent, states "I don't want to live like this anymore" and did take three xanax 0.25 prior to arrival but not an attempt to OD; stopped cannabis a few days ago, denies ETOH use; this is an anxious but pleasant pt, wanding done and clothes and valuables taken, CO begun. 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

## 2024-08-30 NOTE — ED ADULT NURSE REASSESSMENT NOTE - NS ED NURSE REASSESS COMMENT FT1
Pt had received ativan 1mg PO this AM and was endorsing relief of anxiety, throughout day he appeared more relaxed and was smiling and laughing with staff; in late afternoon after being cleared by psychiatry pt began perseverating stating he did not want to go home, was spoken to by attending and resident as well as writer and remained anxious, pt was given referrals to [facility] and was told to follow up, his sister was at bedside the whole time, pt remained resistant and asked for some time before he left.

## 2024-08-30 NOTE — ED PROVIDER NOTE - PHYSICAL EXAMINATION
General: Anxious appearing, well nourished, well kept.   HEENT: EOMI, PERRLA, normal mucosa, normal oropharynx, no lesions on the lips or on oral mucosa, normal external ear  Neck: supple, no lymphadenopathy, full range of motion, no nuchal rigidity  CV: RRR, normal S1 and S2 with no murmur, capillary refill less than two seconds  Resp: lungs CTA b/l, good aeration bilaterally, symmetric chest wall   Abd: non-distended, soft, non-tender  : no CVA tenderness  MSK: full range of motion, no cyanosis, no edema, no clubbing, no immobility  Neuro: CN II-XII grossly intact, muscle strength 5/5 in all extremities, normal gait  Skin: no rashes, skin intact

## 2024-08-30 NOTE — ED PROVIDER NOTE - CLINICAL SUMMARY MEDICAL DECISION MAKING FREE TEXT BOX
Patient is a 28-year-old male, sister at bedside, no reported past medical history , Daily marijuana user, presents complaining of difficulty breathing/sensation of globus in his chest x approximately 1 week. Of note patient was seen here yesterday for similar complaint.  He was previously seen by ENT and scoped without findings of organic pathology and given Xanax for anxiety which she notes he has been taking several times a day. Here yesterday he had a chest x-ray showing hyperinflated lungs and was discharged after increased peak flow with bronchodilators, given albuterol MDI with spacer.  He was discharged with [facility] resources and pulm follow-up.  He states that last night he went home and took his Xanax for anxiety but woke up around 4 AM and he was pacing around his room and could not sleep and felt like his breathing was very quickly and his pulse rate started to go up. On presentation patient states "I feel like I just want to die–I want to go to sleep and not wake up".  When asked if he has a plan he states "not yet but if things keep going how they are…".His sister at bedside states he has sometimes been known to perseverate over some medical diagnoses but has never been this anxious in the past. He denies any homicidal ideation, denies any hallucinations.  Aside from almost daily marijuana use he denies any other substance use including any other illicit drugs or alcohol.  In terms of his current symptoms he states they all started after a URI. He denies any chest pain or syncope/presyncope.  DDx/plan- Given patients' recent workup will not repeat chest xray. This may be obstructive pulmonary disease exacerbated by a recent viral illnes. Took bronchodilator this morning and mildly tachycardic,low suspicion for PE but will obtain D-dimer. Will obtain EKG, trop but extremely low suspicion for ACS. Will obtain psych clearance labs including drug screen and psych consult given passive SI. Patient is a 28-year-old male, sister at bedside, no reported past medical history , Daily marijuana user, presents complaining of difficulty breathing/sensation of globus in his chest x approximately 1 week. Of note patient was seen here yesterday for similar complaint.  He was previously seen by ENT and scoped without findings of organic pathology and given Xanax for anxiety which she notes he has been taking several times a day. Here yesterday he had a chest x-ray showing hyperinflated lungs and was discharged after increased peak flow with bronchodilators, given albuterol MDI with spacer.  He was discharged with [facility] resources and pulm follow-up.  He states that last night he went home and took his Xanax for anxiety but woke up around 4 AM and he was pacing around his room and could not sleep and felt like his breathing was very quickly and his pulse rate started to go up. On presentation patient states "I feel like I just want to die–I want to go to sleep and not wake up".  When asked if he has a plan he states "not yet but if things keep going how they are…".His sister at bedside states he has sometimes been known to perseverate over some medical diagnoses but has never been this anxious in the past. He denies any homicidal ideation, denies any hallucinations.  Aside from almost daily marijuana use he denies any other substance use including any other illicit drugs or alcohol.  In terms of his current symptoms he states they all started after a URI. He denies any chest pain or syncope/presyncope.  DDx/plan- Given patients' recent workup will not repeat chest x-ray. This may be obstructive pulmonary disease exacerbated by a recent viral illnesses. Took bronchodilator this morning and mildly tachycardic, low suspicion for PE but will obtain D-dimer. Will obtain EKG, trop but extremely low suspicion for ACS. Will obtain psych clearance labs including drug screen and psych consult given passive SI.

## 2024-08-30 NOTE — ED BEHAVIORAL HEALTH ASSESSMENT NOTE - DETAILS
Father had anxiety SOB N/A discussed with patient. Call 911 or return to ED should sx. persist/worsen Plan communicated

## 2024-08-30 NOTE — ED BEHAVIORAL HEALTH ASSESSMENT NOTE - HPI (INCLUDE ILLNESS QUALITY, SEVERITY, DURATION, TIMING, CONTEXT, MODIFYING FACTORS, ASSOCIATED SIGNS AND SYMPTOMS)
Patient is a 28-year-old male without previous medical or psychiatric history who presents to ED because of persistent SOB after recent URI. He was in a good state of health up until last Wednesday, when he started to feel cold-like symptoms.  Majority of his symptoms resolved by Sunday, but SOB persisted, which made him anxious that SOB will become chronic. He has seen ENT specialist complaining of feeling bolus-like sensation in his throat. He underwent unrevealing endoscopy and ENT gave him Xanax for anxiety. Hi takes Xanax 12.5mg up to 3x daily and it helps him relax to some extent, but does not solve the issue. He was in the ED yesterday, was given Albuterol and sent home. He was able to fall asleep at home, but woke up at around 4 AM with SOB and anxiety. He decided to return to the ED.   His hobbies include going to the gym and pool, but he couldn't engage in those activities since the onset of his symptoms. He usually cares well for his diet monitoring calories for bodybuilding purposes, but was not able to eat much for the past week due to worries about his condition. He domiciles with siblings and parents, with whom he has supportive relationships.  Admits to smoking marijuana multiple times a week, with the last time smoking around a week ago. Denies using tobacco products, but reports rare consumption of alcohol. A&Ox4. Denies depression. Denies ever having manic episodes. Denies AVH/ HIIB. He states that "I don't want to live like this... with these symptoms", but denies wanting to hurt himself, or having any plan. Denies ever having suicidal thoughts. Denies any ideas that are odd/delusions. Denies any history of anxiety even after stressful events, such as death of his father two years ago. Patient is a 28-year-old male without previous medical or psychiatric history who presents to ED because of persistent SOB after recent URI. He was in a good state of health up until last Wednesday, when he started to feel cold-like symptoms.  Majority of his symptoms resolved by Sunday, but SOB persisted, which made him anxious that SOB will become chronic. He has seen ENT specialist complaining of feeling bolus-like sensation in his throat. He underwent unrevealing endoscopy and ENT gave him Xanax for anxiety. Hi takes Xanax 12.5mg up to 3x daily and it helps him relax to some extent, but does not solve the issue. He was in the ED yesterday, was given Albuterol and sent home. He was able to fall asleep at home, but woke up at around 4 AM with SOB and anxiety. He decided to return to the ED.     His hobbies include going to the gym and pool, but he couldn't engage in those activities since the onset of his symptoms. He usually cares well for his diet monitoring calories for bodybuilding purposes, but was not able to eat much for the past week due to worries about his condition. He domiciles with siblings and parents, with whom he has supportive relationships.  Admits to smoking marijuana multiple times a week, with the last time smoking around a week ago. Denies using tobacco products, but reports rare consumption of alcohol. A&Ox4. Denies depression. Denies ever having manic episodes. Denies AVH/ HIIB. He states that "I don't want to live like this... with these symptoms", but denies wanting to hurt himself, or having any plan. Denies ever having suicidal thoughts. Denies any ideas that are odd/delusions. Denies any history of anxiety even after stressful events, such as death of his father two years ago.

## 2024-08-30 NOTE — ED PROVIDER NOTE - PROGRESS NOTE DETAILS
Horacio Diaz DO (PGY-2) Psych consulted. Patient Has been anxious and pacing room for several hours. Horacio Diaz, DO (PGY-2) Patient reevaluated at bedside. I auscultated the patient's heart and lungs again lungs are clear to auscultation bilaterally heart normal S1-S2 no murmurs rubs or gallops.At this time he is not having any symptoms.  He still endorses concern that he will have another episode of difficulty breathing and anxiety.  Psychiatry evaluated the patient stating okay to treat and release, will write note shortly.

## 2024-08-30 NOTE — ED PROVIDER NOTE - ATTENDING CONTRIBUTION TO CARE
Emergency Medicine Attending MD Harris:  patient seen and evaluated with the resident.  I was present for key portions of the History & Physical, and I agree with the Impression & Plan.    Patient is a 28-year-old male complaining of shortness of breath and anxiety x 5 days.  Patient endorses the sensation is in the center of his chest and throat; feels like he just cannot take a deep breath.  He is worried that he may stop breathing.  He took 3x 12.5 mg doses of Xanax, that was prescribed by ENT earlier in the week.  Patient self medicated with albuterol as well, and he feels like both medications are starting to kick him and providing him some relief at this moment.      While in the ED yesterday patient underwent basic labs that were unremarkable.  Chest x-ray showed hyperexpanded lungs.  No infiltrates or pneumothorax appreciated at that time.  ECG was performed and documented as normal.    Since being discharged yesterday his symptoms have continued; he is still worried that he may spontaneously stop breathing.  Patient is here with his sister who endorses that he occasionally perseverates over physical ailments and always manages to "fight through them" but she has never seen him this anxious over a physical symptom before.    Social:  works as a , and [employer] .  Smokes marijuana.    VS: Heart rate borderline tachycardic, otherwise within normal limits  Gen: Anxious appearing adult male  Head: NC/AT  Neck: trachea midline  Resp:  No distress, clear to auscultation bilaterally throughout  CV: RRR, no RMG  Abd: nondistended  Ext: no deformities  Neuro:  A&Ox4 appears non focal  Skin:  Warm and dry as visualized  Psych: appropriate    Medical Decision Making / Differential Diagnosis:  HPI concerning for anxiety disorder.  He does not have any PE risk factors or ACS risk factors at this time.    Plan: Screening labs, troponin, D-dimer, ECG.  Repeat chest x-ray not indicated at this time.  If the above workup is unremarkable, then it would be appropriate to consult psychiatry for further evaluation.

## 2024-08-30 NOTE — ED PROVIDER NOTE - NSFOLLOWUPINSTRUCTIONS_ED_ALL_ED_FT
1. Return to ED for worsening, progressive or any other concerning symptoms   2. Follow up with your primary care doctor in 2-3days  3. [facility] [id number] [facility]  [phone]

## 2024-08-30 NOTE — ED PROVIDER NOTE - PATIENT PORTAL LINK FT
You can access the FollowMyHealth Patient Portal offered by [facility] by registering at the following website: [email or web address]. By joining [facility]’s FollowMyHealth portal, you will also be able to view your health information using other applications (apps) compatible with our system.

## 2024-08-30 NOTE — ED BEHAVIORAL HEALTH ASSESSMENT NOTE - NSBHATTESTCOMMENTATTENDFT_PSY_A_CORE
This is a 28-y.o. CM patient w/o PPH and with PMH of recent URI, after which he developed SOB and associated anxiety. His ENT workup and X-ray were negative for pathology and he received albuterol and Xanax to help his symptoms yesterday. Today, he returned to ED for SOB, stating that "I can't live like this, I have trouble breathing," Consult requested to evaluate patient for suicidality.    I have seen and evaluated this patient myself. Chart, labs, meds reviewed. I agree with trainee's assessment and plan. Patient presenting with new-onset anxiety in the context of SOB post recent URI. Counseling and support provided. Patient not an imminent threat to self or others at this time.

## 2024-08-30 NOTE — ED BEHAVIORAL HEALTH ASSESSMENT NOTE - REASON FOR REFERRAL
pt was here with SOB yesterday with similar symptoms says if symptoms don't resolve he wants to die cant do this

## 2024-09-04 ENCOUNTER — EMERGENCY (EMERGENCY)
Facility: HOSPITAL | Age: 29
LOS: 1 days | Discharge: HOME | End: 2024-09-04
Attending: STUDENT IN AN ORGANIZED HEALTH CARE EDUCATION/TRAINING PROGRAM | Admitting: STUDENT IN AN ORGANIZED HEALTH CARE EDUCATION/TRAINING PROGRAM
Payer: MEDICAID

## 2024-09-04 VITALS
OXYGEN SATURATION: 100 % | SYSTOLIC BLOOD PRESSURE: 150 MMHG | DIASTOLIC BLOOD PRESSURE: 87 MMHG | TEMPERATURE: 98 F | HEIGHT: 70 IN | RESPIRATION RATE: 18 BRPM | WEIGHT: 158.07 LBS | HEART RATE: 90 BPM

## 2024-09-04 PROBLEM — Z78.9 OTHER SPECIFIED HEALTH STATUS: Chronic | Status: ACTIVE | Noted: 2024-09-01

## 2024-09-04 LAB
ADD ON TEST-SPECIMEN IN LAB: SIGNIFICANT CHANGE UP
ALBUMIN SERPL ELPH-MCNC: 4.4 G/DL — SIGNIFICANT CHANGE UP (ref 3.3–5)
ALP SERPL-CCNC: 72 U/L — SIGNIFICANT CHANGE UP (ref 40–120)
ALT FLD-CCNC: 20 U/L — SIGNIFICANT CHANGE UP (ref 4–41)
ANION GAP SERPL CALC-SCNC: 13 MMOL/L — SIGNIFICANT CHANGE UP (ref 7–14)
AST SERPL-CCNC: 20 U/L — SIGNIFICANT CHANGE UP (ref 4–40)
BASOPHILS # BLD AUTO: 0.03 K/UL — SIGNIFICANT CHANGE UP (ref 0–0.2)
BASOPHILS NFR BLD AUTO: 0.3 % — SIGNIFICANT CHANGE UP (ref 0–2)
BILIRUB SERPL-MCNC: 0.4 MG/DL — SIGNIFICANT CHANGE UP (ref 0.2–1.2)
BUN SERPL-MCNC: 20 MG/DL — SIGNIFICANT CHANGE UP (ref 7–23)
CALCIUM SERPL-MCNC: 9.7 MG/DL — SIGNIFICANT CHANGE UP (ref 8.4–10.5)
CHLORIDE SERPL-SCNC: 104 MMOL/L — SIGNIFICANT CHANGE UP (ref 98–107)
CO2 SERPL-SCNC: 22 MMOL/L — SIGNIFICANT CHANGE UP (ref 22–31)
CREAT SERPL-MCNC: 1.08 MG/DL — SIGNIFICANT CHANGE UP (ref 0.5–1.3)
EGFR: 96 ML/MIN/1.73M2 — SIGNIFICANT CHANGE UP
EGFR: 96 ML/MIN/1.73M2 — SIGNIFICANT CHANGE UP
EOSINOPHIL # BLD AUTO: 0.09 K/UL — SIGNIFICANT CHANGE UP (ref 0–0.5)
EOSINOPHIL NFR BLD AUTO: 1 % — SIGNIFICANT CHANGE UP (ref 0–6)
GLUCOSE SERPL-MCNC: 107 MG/DL — HIGH (ref 70–99)
HCT VFR BLD CALC: 42.3 % — SIGNIFICANT CHANGE UP (ref 39–50)
HGB BLD-MCNC: 14.7 G/DL — SIGNIFICANT CHANGE UP (ref 13–17)
IANC: 6.9 K/UL — SIGNIFICANT CHANGE UP (ref 1.8–7.4)
IMM GRANULOCYTES NFR BLD AUTO: 0.3 % — SIGNIFICANT CHANGE UP (ref 0–0.9)
LYMPHOCYTES # BLD AUTO: 1.14 K/UL — SIGNIFICANT CHANGE UP (ref 1–3.3)
LYMPHOCYTES # BLD AUTO: 13.3 % — SIGNIFICANT CHANGE UP (ref 13–44)
MCHC RBC-ENTMCNC: 28.1 PG — SIGNIFICANT CHANGE UP (ref 27–34)
MCHC RBC-ENTMCNC: 34.8 GM/DL — SIGNIFICANT CHANGE UP (ref 32–36)
MCV RBC AUTO: 80.7 FL — SIGNIFICANT CHANGE UP (ref 80–100)
MONOCYTES # BLD AUTO: 0.41 K/UL — SIGNIFICANT CHANGE UP (ref 0–0.9)
MONOCYTES NFR BLD AUTO: 4.8 % — SIGNIFICANT CHANGE UP (ref 2–14)
NEUTROPHILS # BLD AUTO: 6.9 K/UL — SIGNIFICANT CHANGE UP (ref 1.8–7.4)
NEUTROPHILS NFR BLD AUTO: 80.3 % — HIGH (ref 43–77)
NRBC # BLD AUTO: 0 K/UL — SIGNIFICANT CHANGE UP (ref 0–0)
NRBC # BLD: 0 /100 WBCS — SIGNIFICANT CHANGE UP (ref 0–0)
NRBC # FLD: 0 K/UL — SIGNIFICANT CHANGE UP (ref 0–0)
NRBC BLD-RTO: 0 /100 WBCS — SIGNIFICANT CHANGE UP (ref 0–0)
PLATELET # BLD AUTO: 224 K/UL — SIGNIFICANT CHANGE UP (ref 150–400)
POTASSIUM SERPL-MCNC: 3.9 MMOL/L — SIGNIFICANT CHANGE UP (ref 3.5–5.3)
POTASSIUM SERPL-SCNC: 3.9 MMOL/L — SIGNIFICANT CHANGE UP (ref 3.5–5.3)
PROT SERPL-MCNC: 6.9 G/DL — SIGNIFICANT CHANGE UP (ref 6–8.3)
RBC # BLD: 5.24 M/UL — SIGNIFICANT CHANGE UP (ref 4.2–5.8)
RBC # FLD: 12.5 % — SIGNIFICANT CHANGE UP (ref 10.3–14.5)
SODIUM SERPL-SCNC: 139 MMOL/L — SIGNIFICANT CHANGE UP (ref 135–145)
TROPONIN T, HIGH SENSITIVITY RESULT: 8 NG/L — SIGNIFICANT CHANGE UP
WBC # BLD: 8.6 K/UL — SIGNIFICANT CHANGE UP (ref 3.8–10.5)
WBC # FLD AUTO: 8.6 K/UL — SIGNIFICANT CHANGE UP (ref 3.8–10.5)

## 2024-09-04 PROCEDURE — 99223 1ST HOSP IP/OBS HIGH 75: CPT

## 2024-09-04 PROCEDURE — 93010 ELECTROCARDIOGRAM REPORT: CPT

## 2024-09-04 RX ADMIN — Medication 1000 MILLILITER(S): at 14:59

## 2024-09-05 ENCOUNTER — RESULT REVIEW (OUTPATIENT)
Age: 29
End: 2024-09-05

## 2024-09-05 VITALS
HEART RATE: 76 BPM | SYSTOLIC BLOOD PRESSURE: 112 MMHG | RESPIRATION RATE: 16 BRPM | OXYGEN SATURATION: 97 % | DIASTOLIC BLOOD PRESSURE: 61 MMHG | TEMPERATURE: 98 F

## 2024-09-05 PROCEDURE — 93306 TTE W/DOPPLER COMPLETE: CPT | Mod: 26

## 2024-09-05 PROCEDURE — 99239 HOSP IP/OBS DSCHRG MGMT >30: CPT

## 2024-09-06 LAB — DRUG SCREEN, SERUM: ABNORMAL

## 2024-09-11 ENCOUNTER — APPOINTMENT (OUTPATIENT)
Dept: PULMONOLOGY | Facility: CLINIC | Age: 29
End: 2024-09-11

## 2024-12-20 ENCOUNTER — APPOINTMENT (OUTPATIENT)
Dept: DERMATOLOGY | Facility: CLINIC | Age: 29
End: 2024-12-20

## 2025-01-08 ENCOUNTER — NON-APPOINTMENT (OUTPATIENT)
Age: 30
End: 2025-01-08

## 2025-01-08 ENCOUNTER — APPOINTMENT (OUTPATIENT)
Dept: DERMATOLOGY | Facility: CLINIC | Age: 30
End: 2025-01-08

## 2025-01-08 DIAGNOSIS — B07.9 VIRAL WART, UNSPECIFIED: ICD-10-CM

## 2025-01-08 PROCEDURE — 17110 DESTRUCTION B9 LES UP TO 14: CPT

## 2025-01-08 PROCEDURE — 99203 OFFICE O/P NEW LOW 30 MIN: CPT | Mod: 25

## 2025-02-07 ENCOUNTER — APPOINTMENT (OUTPATIENT)
Dept: DERMATOLOGY | Facility: CLINIC | Age: 30
End: 2025-02-07